# Patient Record
Sex: FEMALE | Race: WHITE | Employment: FULL TIME | URBAN - METROPOLITAN AREA
[De-identification: names, ages, dates, MRNs, and addresses within clinical notes are randomized per-mention and may not be internally consistent; named-entity substitution may affect disease eponyms.]

---

## 2017-11-10 PROCEDURE — 87510 GARDNER VAG DNA DIR PROBE: CPT | Performed by: NURSE PRACTITIONER

## 2017-11-10 PROCEDURE — 87660 TRICHOMONAS VAGIN DIR PROBE: CPT | Performed by: NURSE PRACTITIONER

## 2017-11-10 PROCEDURE — 87480 CANDIDA DNA DIR PROBE: CPT | Performed by: NURSE PRACTITIONER

## 2019-02-15 PROCEDURE — 87086 URINE CULTURE/COLONY COUNT: CPT | Performed by: NURSE PRACTITIONER

## 2019-02-22 PROCEDURE — 87086 URINE CULTURE/COLONY COUNT: CPT | Performed by: PHYSICIAN ASSISTANT

## 2019-03-01 ENCOUNTER — TELEPHONE (OUTPATIENT)
Dept: OBGYN CLINIC | Facility: CLINIC | Age: 34
End: 2019-03-01

## 2019-03-01 NOTE — TELEPHONE ENCOUNTER
Received thru fax from [facility] result of pt pap and HPV. Pt has an appt with you for new problem gyne on April 8th at 11:30 am in [place]. Please review result.   Thanks

## 2023-04-03 ENCOUNTER — OFFICE VISIT (OUTPATIENT)
Dept: FAMILY MEDICINE CLINIC | Facility: CLINIC | Age: 38
End: 2023-04-03
Payer: COMMERCIAL

## 2023-04-03 VITALS
HEIGHT: 62.5 IN | SYSTOLIC BLOOD PRESSURE: 104 MMHG | HEART RATE: 98 BPM | OXYGEN SATURATION: 98 % | DIASTOLIC BLOOD PRESSURE: 64 MMHG | RESPIRATION RATE: 16 BRPM | BODY MASS INDEX: 22.97 KG/M2 | WEIGHT: 128 LBS

## 2023-04-03 DIAGNOSIS — E04.9 GOITER: ICD-10-CM

## 2023-04-03 DIAGNOSIS — Z00.00 ANNUAL PHYSICAL EXAM: Primary | ICD-10-CM

## 2023-04-03 DIAGNOSIS — Z12.31 ENCOUNTER FOR SCREENING MAMMOGRAM FOR HIGH-RISK PATIENT: ICD-10-CM

## 2023-04-03 DIAGNOSIS — Z87.42 HISTORY OF ABNORMAL CERVICAL PAP SMEAR: ICD-10-CM

## 2023-04-03 DIAGNOSIS — E56.9 VITAMIN DEFICIENCY: ICD-10-CM

## 2023-04-03 DIAGNOSIS — Z80.3 FAMILY HISTORY OF BREAST CANCER IN FIRST DEGREE RELATIVE: ICD-10-CM

## 2023-04-03 PROCEDURE — 3078F DIAST BP <80 MM HG: CPT | Performed by: FAMILY MEDICINE

## 2023-04-03 PROCEDURE — 3074F SYST BP LT 130 MM HG: CPT | Performed by: FAMILY MEDICINE

## 2023-04-03 PROCEDURE — 99385 PREV VISIT NEW AGE 18-39: CPT | Performed by: FAMILY MEDICINE

## 2023-04-03 PROCEDURE — 3008F BODY MASS INDEX DOCD: CPT | Performed by: FAMILY MEDICINE

## 2023-04-03 RX ORDER — NAPROXEN 500 MG/1
TABLET ORAL
COMMUNITY
Start: 2023-01-24

## 2023-04-05 ENCOUNTER — TELEPHONE (OUTPATIENT)
Dept: FAMILY MEDICINE CLINIC | Facility: CLINIC | Age: 38
End: 2023-04-05

## 2023-04-05 NOTE — TELEPHONE ENCOUNTER
I called [address] [place] [address] for verification/inquiry purposes on patient getting the varicella vaccine with an allergy noted to neomycin in the chart. Spoke with [facility] pharmacist: Monika Loco   NOT a contraindication if neomycin causes a dermatis reaction. Do not use if anaphylaxis/anaphlactoid reaction noted.     Dr. Acosta Holland, please see message for pharmacist.

## 2023-04-17 ENCOUNTER — LAB ENCOUNTER (OUTPATIENT)
Dept: LAB | Age: 38
End: 2023-04-17
Attending: FAMILY MEDICINE
Payer: COMMERCIAL

## 2023-04-17 ENCOUNTER — HOSPITAL ENCOUNTER (OUTPATIENT)
Dept: ULTRASOUND IMAGING | Age: 38
Discharge: HOME | End: 2023-04-17
Attending: FAMILY MEDICINE
Payer: COMMERCIAL

## 2023-04-17 DIAGNOSIS — E04.9 GOITER: ICD-10-CM

## 2023-04-17 DIAGNOSIS — E56.9 VITAMIN DEFICIENCY: ICD-10-CM

## 2023-04-17 DIAGNOSIS — Z00.00 ANNUAL PHYSICAL EXAM: ICD-10-CM

## 2023-04-17 LAB
ALBUMIN SERPL-MCNC: 3.8 G/DL (ref 3.4–5)
ALBUMIN/GLOB SERPL: 1.1 {RATIO} (ref 1–2)
ALP LIVER SERPL-CCNC: 53 U/L
ALT SERPL-CCNC: 20 U/L
ANION GAP SERPL CALC-SCNC: 1 MMOL/L (ref 0–18)
AST SERPL-CCNC: 11 U/L (ref 15–37)
BASOPHILS # BLD AUTO: 0.05 X10(3) UL (ref 0–0.2)
BASOPHILS NFR BLD AUTO: 0.9 %
BILIRUB SERPL-MCNC: 0.6 MG/DL (ref 0.1–2)
BUN BLD-MCNC: 10 MG/DL (ref 7–18)
CALCIUM BLD-MCNC: 8.9 MG/DL (ref 8.5–10.1)
CHLORIDE SERPL-SCNC: 109 MMOL/L (ref 98–112)
CHOLEST SERPL-MCNC: 170 MG/DL (ref ?–200)
CO2 SERPL-SCNC: 24 MMOL/L (ref 21–32)
CREAT BLD-MCNC: 0.8 MG/DL
DEPRECATED HBV CORE AB SER IA-ACNC: 89.6 NG/ML
EOSINOPHIL # BLD AUTO: 0.1 X10(3) UL (ref 0–0.7)
EOSINOPHIL NFR BLD AUTO: 1.9 %
ERYTHROCYTE [DISTWIDTH] IN BLOOD BY AUTOMATED COUNT: 12.2 %
FASTING PATIENT LIPID ANSWER: YES
FASTING STATUS PATIENT QL REPORTED: YES
GFR SERPLBLD BASED ON 1.73 SQ M-ARVRAT: 97 ML/MIN/1.73M2 (ref 60–?)
GLOBULIN PLAS-MCNC: 3.4 G/DL (ref 2.8–4.4)
GLUCOSE BLD-MCNC: 89 MG/DL (ref 70–99)
HCT VFR BLD AUTO: 40.8 %
HDLC SERPL-MCNC: 57 MG/DL (ref 40–59)
HGB BLD-MCNC: 13.2 G/DL
IMM GRANULOCYTES # BLD AUTO: 0.02 X10(3) UL (ref 0–1)
IMM GRANULOCYTES NFR BLD: 0.4 %
LDLC SERPL CALC-MCNC: 95 MG/DL (ref ?–100)
LYMPHOCYTES # BLD AUTO: 1.28 X10(3) UL (ref 1–4)
LYMPHOCYTES NFR BLD AUTO: 24 %
MCH RBC QN AUTO: 29.7 PG (ref 26–34)
MCHC RBC AUTO-ENTMCNC: 32.4 G/DL (ref 31–37)
MCV RBC AUTO: 91.9 FL
MONOCYTES # BLD AUTO: 0.43 X10(3) UL (ref 0.1–1)
MONOCYTES NFR BLD AUTO: 8.1 %
NEUTROPHILS # BLD AUTO: 3.45 X10 (3) UL (ref 1.5–7.7)
NEUTROPHILS # BLD AUTO: 3.45 X10(3) UL (ref 1.5–7.7)
NEUTROPHILS NFR BLD AUTO: 64.7 %
NONHDLC SERPL-MCNC: 113 MG/DL (ref ?–130)
OSMOLALITY SERPL CALC.SUM OF ELEC: 277 MOSM/KG (ref 275–295)
PLATELET # BLD AUTO: 222 10(3)UL (ref 150–450)
POTASSIUM SERPL-SCNC: 4.1 MMOL/L (ref 3.5–5.1)
PROT SERPL-MCNC: 7.2 G/DL (ref 6.4–8.2)
RBC # BLD AUTO: 4.44 X10(6)UL
SODIUM SERPL-SCNC: 134 MMOL/L (ref 136–145)
T4 FREE SERPL-MCNC: 0.9 NG/DL (ref 0.8–1.7)
THYROGLOB SERPL-MCNC: <15 U/ML (ref ?–60)
THYROPEROXIDASE AB SERPL-ACNC: 108 U/ML (ref ?–60)
TRIGL SERPL-MCNC: 101 MG/DL (ref 30–149)
TSI SER-ACNC: 2.98 MIU/ML (ref 0.36–3.74)
VIT B12 SERPL-MCNC: 178 PG/ML (ref 193–986)
VIT D+METAB SERPL-MCNC: 17.6 NG/ML (ref 30–100)
VLDLC SERPL CALC-MCNC: 16 MG/DL (ref 0–30)
WBC # BLD AUTO: 5.3 X10(3) UL (ref 4–11)

## 2023-04-17 PROCEDURE — 82607 VITAMIN B-12: CPT | Performed by: FAMILY MEDICINE

## 2023-04-17 PROCEDURE — 86376 MICROSOMAL ANTIBODY EACH: CPT | Performed by: FAMILY MEDICINE

## 2023-04-17 PROCEDURE — 82306 VITAMIN D 25 HYDROXY: CPT | Performed by: FAMILY MEDICINE

## 2023-04-17 PROCEDURE — 86800 THYROGLOBULIN ANTIBODY: CPT | Performed by: FAMILY MEDICINE

## 2023-04-17 PROCEDURE — 84439 ASSAY OF FREE THYROXINE: CPT | Performed by: FAMILY MEDICINE

## 2023-04-17 PROCEDURE — 80061 LIPID PANEL: CPT | Performed by: FAMILY MEDICINE

## 2023-04-17 PROCEDURE — 80050 GENERAL HEALTH PANEL: CPT | Performed by: FAMILY MEDICINE

## 2023-04-17 PROCEDURE — 82728 ASSAY OF FERRITIN: CPT | Performed by: FAMILY MEDICINE

## 2023-04-17 PROCEDURE — 76536 US EXAM OF HEAD AND NECK: CPT | Performed by: FAMILY MEDICINE

## 2023-04-18 DIAGNOSIS — E53.8 VITAMIN B12 DEFICIENCY: Primary | ICD-10-CM

## 2023-04-18 DIAGNOSIS — R79.89 ABNORMAL THYROID BLOOD TEST: ICD-10-CM

## 2023-04-18 DIAGNOSIS — E55.9 VITAMIN D DEFICIENCY: ICD-10-CM

## 2023-04-25 ENCOUNTER — TELEMEDICINE (OUTPATIENT)
Dept: FAMILY MEDICINE CLINIC | Facility: CLINIC | Age: 38
End: 2023-04-25
Payer: COMMERCIAL

## 2023-04-25 DIAGNOSIS — E06.3 HASHIMOTO'S THYROIDITIS: Primary | ICD-10-CM

## 2023-04-25 DIAGNOSIS — E56.9 VITAMIN DEFICIENCY: ICD-10-CM

## 2023-04-25 DIAGNOSIS — Z83.79 FAMILY HISTORY OF CELIAC DISEASE: ICD-10-CM

## 2023-04-25 PROCEDURE — 99214 OFFICE O/P EST MOD 30 MIN: CPT | Performed by: FAMILY MEDICINE

## 2023-04-26 ENCOUNTER — TELEPHONE (OUTPATIENT)
Dept: FAMILY MEDICINE CLINIC | Facility: CLINIC | Age: 38
End: 2023-04-26

## 2023-04-26 NOTE — TELEPHONE ENCOUNTER
Pt sched for b12 shots for the next 4 wks, then monthly thru July.   Pt also schedule for a varicella vaccine

## 2023-04-27 ENCOUNTER — NURSE ONLY (OUTPATIENT)
Dept: FAMILY MEDICINE CLINIC | Facility: CLINIC | Age: 38
End: 2023-04-27
Payer: COMMERCIAL

## 2023-04-27 DIAGNOSIS — E56.9 VITAMIN DEFICIENCY: Primary | ICD-10-CM

## 2023-04-27 PROCEDURE — 96372 THER/PROPH/DIAG INJ SC/IM: CPT | Performed by: FAMILY MEDICINE

## 2023-04-27 PROCEDURE — 90471 IMMUNIZATION ADMIN: CPT | Performed by: FAMILY MEDICINE

## 2023-04-27 PROCEDURE — 90716 VAR VACCINE LIVE SUBQ: CPT | Performed by: FAMILY MEDICINE

## 2023-04-27 RX ORDER — CYANOCOBALAMIN 1000 UG/ML
1000 INJECTION, SOLUTION INTRAMUSCULAR; SUBCUTANEOUS ONCE
Status: COMPLETED | OUTPATIENT
Start: 2023-04-27 | End: 2023-04-27

## 2023-04-27 RX ADMIN — CYANOCOBALAMIN 1000 MCG: 1000 INJECTION, SOLUTION INTRAMUSCULAR; SUBCUTANEOUS at 11:22:00

## 2023-05-02 ENCOUNTER — HOSPITAL ENCOUNTER (OUTPATIENT)
Dept: MAMMOGRAPHY | Age: 38
Discharge: HOME | End: 2023-05-02
Attending: FAMILY MEDICINE
Payer: COMMERCIAL

## 2023-05-02 ENCOUNTER — LAB ENCOUNTER (OUTPATIENT)
Dept: LAB | Age: 38
End: 2023-05-02
Attending: FAMILY MEDICINE
Payer: COMMERCIAL

## 2023-05-02 DIAGNOSIS — Z12.31 ENCOUNTER FOR SCREENING MAMMOGRAM FOR HIGH-RISK PATIENT: ICD-10-CM

## 2023-05-02 DIAGNOSIS — Z80.3 FAMILY HISTORY OF BREAST CANCER IN FIRST DEGREE RELATIVE: ICD-10-CM

## 2023-05-02 DIAGNOSIS — E56.9 VITAMIN DEFICIENCY: ICD-10-CM

## 2023-05-02 DIAGNOSIS — Z83.79 FAMILY HISTORY OF CELIAC DISEASE: ICD-10-CM

## 2023-05-02 LAB
FOLATE SERPL-MCNC: 14 NG/ML (ref 8.7–?)
IGA SERPL-MCNC: 201 MG/DL (ref 70–312)

## 2023-05-02 PROCEDURE — 77067 SCR MAMMO BI INCL CAD: CPT | Performed by: FAMILY MEDICINE

## 2023-05-02 PROCEDURE — 77063 BREAST TOMOSYNTHESIS BI: CPT | Performed by: FAMILY MEDICINE

## 2023-05-02 PROCEDURE — 82746 ASSAY OF FOLIC ACID SERUM: CPT | Performed by: FAMILY MEDICINE

## 2023-05-02 PROCEDURE — 81377 HLA II TYPE 1 AG EQUIV LR: CPT | Performed by: FAMILY MEDICINE

## 2023-05-02 PROCEDURE — 86364 TISS TRNSGLTMNASE EA IG CLAS: CPT | Performed by: FAMILY MEDICINE

## 2023-05-03 DIAGNOSIS — R92.2 DENSE BREAST TISSUE ON MAMMOGRAM: Primary | ICD-10-CM

## 2023-05-04 ENCOUNTER — NURSE ONLY (OUTPATIENT)
Dept: FAMILY MEDICINE CLINIC | Facility: CLINIC | Age: 38
End: 2023-05-04
Payer: COMMERCIAL

## 2023-05-04 DIAGNOSIS — E56.9 VITAMIN DEFICIENCY: Primary | ICD-10-CM

## 2023-05-04 LAB — TTG IGA SER-ACNC: 0.5 U/ML (ref ?–7)

## 2023-05-04 PROCEDURE — 96372 THER/PROPH/DIAG INJ SC/IM: CPT | Performed by: FAMILY MEDICINE

## 2023-05-04 RX ORDER — CYANOCOBALAMIN 1000 UG/ML
1000 INJECTION, SOLUTION INTRAMUSCULAR; SUBCUTANEOUS ONCE
Status: COMPLETED | OUTPATIENT
Start: 2023-05-04 | End: 2023-05-04

## 2023-05-04 RX ADMIN — CYANOCOBALAMIN 1000 MCG: 1000 INJECTION, SOLUTION INTRAMUSCULAR; SUBCUTANEOUS at 10:52:00

## 2023-05-11 ENCOUNTER — NURSE ONLY (OUTPATIENT)
Dept: FAMILY MEDICINE CLINIC | Facility: CLINIC | Age: 38
End: 2023-05-11
Payer: COMMERCIAL

## 2023-05-11 DIAGNOSIS — E56.9 VITAMIN DEFICIENCY: Primary | ICD-10-CM

## 2023-05-11 PROCEDURE — 96372 THER/PROPH/DIAG INJ SC/IM: CPT | Performed by: FAMILY MEDICINE

## 2023-05-11 RX ORDER — CYANOCOBALAMIN 1000 UG/ML
1000 INJECTION, SOLUTION INTRAMUSCULAR; SUBCUTANEOUS ONCE
Status: COMPLETED | OUTPATIENT
Start: 2023-05-11 | End: 2023-05-11

## 2023-05-11 RX ADMIN — CYANOCOBALAMIN 1000 MCG: 1000 INJECTION, SOLUTION INTRAMUSCULAR; SUBCUTANEOUS at 11:21:00

## 2023-05-12 LAB
DQ2 (DQA1 0501/0505,DQB1 02XX): NEGATIVE
DQ8 (DQA1 03XX, DQB1 0302): NEGATIVE

## 2023-05-18 ENCOUNTER — NURSE ONLY (OUTPATIENT)
Dept: FAMILY MEDICINE CLINIC | Facility: CLINIC | Age: 38
End: 2023-05-18
Payer: COMMERCIAL

## 2023-05-18 DIAGNOSIS — E53.8 B12 DEFICIENCY: Primary | ICD-10-CM

## 2023-05-18 PROCEDURE — 96372 THER/PROPH/DIAG INJ SC/IM: CPT | Performed by: FAMILY MEDICINE

## 2023-05-18 RX ORDER — CYANOCOBALAMIN 1000 UG/ML
1000 INJECTION, SOLUTION INTRAMUSCULAR; SUBCUTANEOUS ONCE
Status: COMPLETED | OUTPATIENT
Start: 2023-05-18 | End: 2023-05-18

## 2023-05-18 RX ADMIN — CYANOCOBALAMIN 1000 MCG: 1000 INJECTION, SOLUTION INTRAMUSCULAR; SUBCUTANEOUS at 10:49:00

## 2023-05-25 ENCOUNTER — NURSE ONLY (OUTPATIENT)
Dept: FAMILY MEDICINE CLINIC | Facility: CLINIC | Age: 38
End: 2023-05-25
Payer: COMMERCIAL

## 2023-05-25 PROCEDURE — 90716 VAR VACCINE LIVE SUBQ: CPT | Performed by: FAMILY MEDICINE

## 2023-05-25 PROCEDURE — 90471 IMMUNIZATION ADMIN: CPT | Performed by: FAMILY MEDICINE

## 2023-06-16 ENCOUNTER — NURSE ONLY (OUTPATIENT)
Dept: FAMILY MEDICINE CLINIC | Facility: CLINIC | Age: 38
End: 2023-06-16
Payer: COMMERCIAL

## 2023-06-16 DIAGNOSIS — E53.8 B12 DEFICIENCY: Primary | ICD-10-CM

## 2023-06-16 PROCEDURE — 96372 THER/PROPH/DIAG INJ SC/IM: CPT | Performed by: FAMILY MEDICINE

## 2023-06-16 RX ORDER — CYANOCOBALAMIN 1000 UG/ML
1000 INJECTION, SOLUTION INTRAMUSCULAR; SUBCUTANEOUS ONCE
Status: COMPLETED | OUTPATIENT
Start: 2023-06-16 | End: 2023-06-16

## 2023-06-16 RX ADMIN — CYANOCOBALAMIN 1000 MCG: 1000 INJECTION, SOLUTION INTRAMUSCULAR; SUBCUTANEOUS at 09:36:00

## 2023-07-14 ENCOUNTER — NURSE ONLY (OUTPATIENT)
Dept: FAMILY MEDICINE CLINIC | Facility: CLINIC | Age: 38
End: 2023-07-14
Payer: COMMERCIAL

## 2023-07-14 DIAGNOSIS — E53.8 B12 DEFICIENCY: Primary | ICD-10-CM

## 2023-07-14 PROCEDURE — 96372 THER/PROPH/DIAG INJ SC/IM: CPT | Performed by: FAMILY MEDICINE

## 2023-07-14 RX ORDER — CYANOCOBALAMIN 1000 UG/ML
1000 INJECTION, SOLUTION INTRAMUSCULAR; SUBCUTANEOUS ONCE
Status: COMPLETED | OUTPATIENT
Start: 2023-07-14 | End: 2023-07-14

## 2023-07-14 RX ADMIN — CYANOCOBALAMIN 1000 MCG: 1000 INJECTION, SOLUTION INTRAMUSCULAR; SUBCUTANEOUS at 13:44:00

## 2023-07-24 ENCOUNTER — LAB ENCOUNTER (OUTPATIENT)
Dept: LAB | Age: 38
End: 2023-07-24
Attending: FAMILY MEDICINE
Payer: COMMERCIAL

## 2023-07-24 DIAGNOSIS — E53.8 VITAMIN B12 DEFICIENCY: ICD-10-CM

## 2023-07-24 DIAGNOSIS — E55.9 VITAMIN D DEFICIENCY: ICD-10-CM

## 2023-07-24 DIAGNOSIS — R79.89 ABNORMAL THYROID BLOOD TEST: ICD-10-CM

## 2023-07-24 DIAGNOSIS — E06.3 HASHIMOTO'S THYROIDITIS: ICD-10-CM

## 2023-07-24 DIAGNOSIS — E56.9 VITAMIN DEFICIENCY: ICD-10-CM

## 2023-07-24 LAB
T4 FREE SERPL-MCNC: 1 NG/DL (ref 0.8–1.7)
THYROGLOB SERPL-MCNC: 23 U/ML (ref ?–60)
THYROPEROXIDASE AB SERPL-ACNC: 103 U/ML (ref ?–60)
TSI SER-ACNC: 2.73 MIU/ML (ref 0.36–3.74)
VIT B12 SERPL-MCNC: 509 PG/ML (ref 193–986)
VIT D+METAB SERPL-MCNC: 34.6 NG/ML (ref 30–100)
VIT D+METAB SERPL-MCNC: 39.7 NG/ML (ref 30–100)

## 2023-07-24 PROCEDURE — 86376 MICROSOMAL ANTIBODY EACH: CPT | Performed by: FAMILY MEDICINE

## 2023-07-24 PROCEDURE — 82607 VITAMIN B-12: CPT | Performed by: FAMILY MEDICINE

## 2023-07-24 PROCEDURE — 86800 THYROGLOBULIN ANTIBODY: CPT | Performed by: FAMILY MEDICINE

## 2023-07-24 PROCEDURE — 82306 VITAMIN D 25 HYDROXY: CPT | Performed by: FAMILY MEDICINE

## 2023-07-24 PROCEDURE — 84443 ASSAY THYROID STIM HORMONE: CPT | Performed by: FAMILY MEDICINE

## 2023-07-24 PROCEDURE — 84439 ASSAY OF FREE THYROXINE: CPT | Performed by: FAMILY MEDICINE

## 2023-07-25 ENCOUNTER — TELEMEDICINE (OUTPATIENT)
Dept: FAMILY MEDICINE CLINIC | Facility: CLINIC | Age: 38
End: 2023-07-25
Payer: COMMERCIAL

## 2023-07-25 DIAGNOSIS — E55.9 VITAMIN D DEFICIENCY: ICD-10-CM

## 2023-07-25 DIAGNOSIS — E53.8 B12 DEFICIENCY: ICD-10-CM

## 2023-07-25 DIAGNOSIS — E06.3 HASHIMOTO'S THYROIDITIS: Primary | ICD-10-CM

## 2023-07-25 PROCEDURE — 99213 OFFICE O/P EST LOW 20 MIN: CPT | Performed by: FAMILY MEDICINE

## 2023-07-25 NOTE — PROGRESS NOTES
This visit is conducted using Telemedicine with live, interactive video and audio. Telehealth outside of [address] Verbal Consent   I conducted a telehealth visit with Baron Ray today, 07/25/23, which was completed using two-way, real-time interactive audio and video communication. This has been done in good trevor to provide continuity of care in the best interest of the provider-patient relationship, due to the COVID -19 public health crisis/national emergency where restrictions of face-to-face office visits are ongoing. Every conscious effort was taken to allow for sufficient and adequate time to complete the visit. The patient was made aware of the limitations of the telehealth visit, including treatment limitations as no physical exam could be performed. The patient was advised to call 911 or to go to the ER in case there was an emergency. The patient was also advised of the potential privacy & security concerns related to the telehealth platform. The patient was made aware of where to find [facility] notice of privacy practices, telehealth consent form and other related consent forms and documents. which are located on the [facility] website. The patient verbally agreed to telehealth consent form, related consents and the risks discussed. Lastly, the patient confirmed that they were in [place]. Included in this visit, time may have been spent reviewing labs, medications, radiology tests and decision making. Appropriate medical decision-making and tests are ordered as detailed in the plan of care above. Coding/billing information is submitted for this visit based on complexity of care and/or time spent for the visit.       HPI:   Baron Ray is a 45year old female who presents for lab follow up    Mom has hashimoto's   On B12 injections / D   Discussed AI diet     Component      Latest Ref Rng 4/17/2023   ANTI-THYROGLOBULIN      <60 U/mL <15    ANTI-THYROPEROXIDASE      <60 U/mL 108 (H)    T4,Free (Direct)      0.8 - 1.7 ng/dL 0.9    TSH      0.358 - 3.740 mIU/mL 2.980    Vitamin B12      193 - 986 pg/mL 178 (L)    FERRITIN      12.0 - 160.0 ng/mL 89.6    VITAMIN D, 25-OH, TOTAL      30.0 - 100.0 ng/mL 17.6 (L)       Legend:  (H) High  (L) Low          Current Outpatient Medications   Medication Sig Dispense Refill    naproxen 500 MG Oral Tab  (Patient not taking: Reported on 4/3/2023)      Clobetasol Propionate 0.05 % External Ointment Apply to AA on the left hand BID x1-2 weeks, then PRN for flares (Patient not taking: Reported on 4/3/2023) 30 g 0    hydrocortisone 2.5 % External Ointment Apply 1 Application topically 2 (two) times daily. Apply to lips BID for 1-2 weeks then PRN (Patient not taking: Reported on 4/3/2023) 28.35 g 2      Past Medical History:   Diagnosis Date    IBS (irritable bowel syndrome)       No past surgical history on file. Family History   Problem Relation Age of Onset    Cancer Mother         kidney    Breast Cancer Paternal Grandmother 45        EST      Social History:   Social History     Socioeconomic History    Marital status:    Occupational History    Occupation: Dance instructor   Tobacco Use    Smoking status: Never    Smokeless tobacco: Never   Vaping Use    Vaping Use: Never used   Substance and Sexual Activity    Alcohol use: Never     Alcohol/week: 0.0 standard drinks of alcohol    Drug use: Never    Sexual activity: Yes     Partners: Male     Occ: . : . Children: none   Dance teacher .    Exercise: yes  Diet: healthy     REVIEW OF SYSTEMS:   GENERAL: feels well otherwise  SKIN: denies any unusual skin lesions  EYES:denies blurred vision or double vision  HEENT: denies nasal congestion, sinus pain or ST  LUNGS: denies shortness of breath with exertion  CARDIOVASCULAR: denies chest pain on exertion  GI: denies abdominal pain,denies heartburn  : denies dysuria, vaginal discharge or itching   MUSCULOSKELETAL: denies back pain  NEURO: denies headaches  PSYCHE: denies depression or anxiety  HEMATOLOGIC: denies hx of anemia  ENDOCRINE: denies thyroid history  ALL/ASTHMA: denies hx of allergy or asthma    EXAM:     alert, appears stated age and cooperative, Normocephalic, without obvious abnormality, atraumatic, lips, mucosa, and tongue normal; teeth and gums normal, Speaking in full sentences comfortably, Normal work of breathing, Skin color, texture, turgor normal. No rashes or lesions and age appropriate, normal, logical connections, person, place and time/date and no suicidal ideation      ASSESSMENT AND PLAN:   Brittany Jimenez is a 45year old female who presents with     1. Hashimoto's thyroiditis    - FREE T4 (FREE THYROXINE); Future  - ASSAY, THYROID STIM HORMONE; Future  - THYROID PEROXIDASE AND THYROGLOBULIN ANTIBODIES; Future    2. B12 deficiency    - VITAMIN B12; Future    3. Vitamin D deficiency        Questions answered and patient indicates understanding of these issues and agrees to the plan.   Follow up in 3-6 MO or sooner if needed

## 2023-08-01 ENCOUNTER — HOSPITAL ENCOUNTER (OUTPATIENT)
Dept: ULTRASOUND IMAGING | Age: 38
Discharge: HOME | End: 2023-08-01
Attending: FAMILY MEDICINE
Payer: COMMERCIAL

## 2023-08-01 DIAGNOSIS — R92.2 DENSE BREAST TISSUE ON MAMMOGRAM: ICD-10-CM

## 2023-08-01 PROCEDURE — 76641 ULTRASOUND BREAST COMPLETE: CPT | Performed by: FAMILY MEDICINE

## 2023-08-17 ENCOUNTER — NURSE ONLY (OUTPATIENT)
Dept: FAMILY MEDICINE CLINIC | Facility: CLINIC | Age: 38
End: 2023-08-17
Payer: COMMERCIAL

## 2023-08-17 DIAGNOSIS — E53.8 B12 DEFICIENCY: Primary | ICD-10-CM

## 2023-08-17 PROCEDURE — 96372 THER/PROPH/DIAG INJ SC/IM: CPT | Performed by: FAMILY MEDICINE

## 2023-08-17 RX ORDER — CYANOCOBALAMIN 1000 UG/ML
1000 INJECTION, SOLUTION INTRAMUSCULAR; SUBCUTANEOUS ONCE
Status: COMPLETED | OUTPATIENT
Start: 2023-08-17 | End: 2023-08-17

## 2023-08-17 RX ADMIN — CYANOCOBALAMIN 1000 MCG: 1000 INJECTION, SOLUTION INTRAMUSCULAR; SUBCUTANEOUS at 15:56:00

## 2023-09-21 ENCOUNTER — NURSE ONLY (OUTPATIENT)
Dept: FAMILY MEDICINE CLINIC | Facility: CLINIC | Age: 38
End: 2023-09-21
Payer: COMMERCIAL

## 2023-09-21 DIAGNOSIS — E53.8 B12 DEFICIENCY: Primary | ICD-10-CM

## 2023-09-21 PROCEDURE — 96372 THER/PROPH/DIAG INJ SC/IM: CPT | Performed by: FAMILY MEDICINE

## 2023-09-21 RX ORDER — CYANOCOBALAMIN 1000 UG/ML
1000 INJECTION, SOLUTION INTRAMUSCULAR; SUBCUTANEOUS ONCE
Status: COMPLETED | OUTPATIENT
Start: 2023-09-21 | End: 2023-09-21

## 2023-09-21 RX ADMIN — CYANOCOBALAMIN 1000 MCG: 1000 INJECTION, SOLUTION INTRAMUSCULAR; SUBCUTANEOUS at 11:01:00

## 2023-09-22 ENCOUNTER — TELEPHONE (OUTPATIENT)
Dept: FAMILY MEDICINE CLINIC | Facility: CLINIC | Age: 38
End: 2023-09-22

## 2023-09-22 NOTE — TELEPHONE ENCOUNTER
Patient came in for B12 injection on 23. Patient states she is trying to conceive and taking pre genet and fish oil. She wants ot know should she switch to a B complex? Continue monthly B12 injections, if so for how many months? Patient to send pre genet with how much b12 is in her supplement.      Component  Ref Range & Units 23  8:19 AM   Vitamin B12  193 - 986 pg/mL 509

## 2023-09-25 NOTE — TELEPHONE ENCOUNTER
See attached vitamins pt is taking. Pt also doing B12 injections monthly, should she switch to OTC b12 as recommended?

## 2024-02-01 LAB
HEPATITIS B SURFACE ANTIGEN OB: NEGATIVE
RH FACTOR OB: POSITIVE

## 2024-06-13 ENCOUNTER — HOSPITAL ENCOUNTER (OUTPATIENT)
Facility: HOSPITAL | Age: 39
Discharge: HOME | End: 2024-06-13
Attending: OBSTETRICS & GYNECOLOGY | Admitting: OBSTETRICS & GYNECOLOGY
Payer: COMMERCIAL

## 2024-06-13 VITALS
DIASTOLIC BLOOD PRESSURE: 55 MMHG | SYSTOLIC BLOOD PRESSURE: 107 MMHG | BODY MASS INDEX: 29 KG/M2 | HEART RATE: 70 BPM | WEIGHT: 163 LBS | RESPIRATION RATE: 18 BRPM | TEMPERATURE: 98 F

## 2024-06-13 LAB
BILIRUBIN URINE: NEGATIVE
BLOOD URINE: NEGATIVE
CONTROL RUN WITHIN 24 HOURS?: YES
GLUCOSE URINE: NEGATIVE
KETONE URINE: NEGATIVE
LEUKOCYTE ESTERASE URINE: NEGATIVE
NITRITE URINE: NEGATIVE
PH URINE: 6.5 (ref 5–8)
PROTEIN URINE: NEGATIVE
SPEC GRAVITY: 1.01 (ref 1–1.03)
URINE CLARITY: CLEAR
URINE COLOR: YELLOW
UROBILINOGEN URINE: 0.2

## 2024-06-13 PROCEDURE — 99214 OFFICE O/P EST MOD 30 MIN: CPT

## 2024-06-13 PROCEDURE — 81002 URINALYSIS NONAUTO W/O SCOPE: CPT

## 2024-06-13 PROCEDURE — 59025 FETAL NON-STRESS TEST: CPT

## 2024-06-13 RX ORDER — CHOLECALCIFEROL (VITAMIN D3) 25 MCG
1 TABLET,CHEWABLE ORAL DAILY
COMMUNITY

## 2024-06-13 RX ORDER — LEVOTHYROXINE SODIUM 0.05 MG/1
50 TABLET ORAL
COMMUNITY

## 2024-06-13 NOTE — DISCHARGE INSTRUCTIONS
Discharge Instructions    Diet: regular diet, drink plenty of fluids  Activity: Normal activity         General Instructions    Call your OB doctor if: Fluid leaking from your vagina;Decrease in fetal movement;Vaginal or rectal pressure;Vaginal bleeding;Uterine contractions increasing in intensity and frequency;Uterine contractions 10 minutes or closer for 1 to 2 hours;Temperature greater than 100F                 Uterine Activity Instructions:

## 2024-06-13 NOTE — NST
Nonstress Test   Patient: Haley Zaldivar    Gestation: 27w6d    NST:       Variability: Moderate           Accelerations: Yes           Decelerations: None            Baseline: 140 BPM           Uterine Irritability: No           Contractions: Not present                                        Acoustic Stimulator: No           Nonstress Test Interpretation: Appropriate for gestational age           Nonstress Test Second Interpretation: Appropriate for gestational age                     Additional Comments

## 2024-06-13 NOTE — PROGRESS NOTES
Pt is a 39 year old female admitted to TRG4/TRG4-A.     Chief Complaint   Patient presents with     Complication    R/o Rom     Having uti symptoms, does have some leaking on pad , seems like urine but unsure. Minimal pain dull and irritation, feels almost like a yeast infection. Denies any bleeding, had bleeding earlier in pregnancy, active fetal movement.       Pt is  27w6d intra-uterine pregnancy.  History obtained, consents signed. Oriented to room, staff, and plan of care.

## 2024-07-24 LAB — HIV RESULT OB: NEGATIVE

## 2024-08-08 LAB — STREP GP B CULT OB: NEGATIVE

## 2024-08-20 ENCOUNTER — TELEPHONE (OUTPATIENT)
Dept: OBGYN UNIT | Facility: HOSPITAL | Age: 39
End: 2024-08-20

## 2024-08-29 ENCOUNTER — TELEPHONE (OUTPATIENT)
Dept: OBGYN UNIT | Facility: HOSPITAL | Age: 39
End: 2024-08-29

## 2024-08-29 ENCOUNTER — HOSPITAL ENCOUNTER (INPATIENT)
Facility: HOSPITAL | Age: 39
LOS: 4 days | Discharge: HOME | End: 2024-09-02
Attending: OBSTETRICS & GYNECOLOGY | Admitting: OBSTETRICS & GYNECOLOGY
Payer: COMMERCIAL

## 2024-08-29 DIAGNOSIS — Z98.891 H/O CESAREAN SECTION: Primary | ICD-10-CM

## 2024-08-29 PROBLEM — Z34.90 PREGNANCY (HCC): Status: ACTIVE | Noted: 2024-08-29

## 2024-08-29 LAB
ANTIBODY SCREEN: NEGATIVE
BASOPHILS # BLD AUTO: 0.03 X10(3) UL (ref 0–0.2)
BASOPHILS NFR BLD AUTO: 0.3 %
EOSINOPHIL # BLD AUTO: 0.09 X10(3) UL (ref 0–0.7)
EOSINOPHIL NFR BLD AUTO: 0.8 %
ERYTHROCYTE [DISTWIDTH] IN BLOOD BY AUTOMATED COUNT: 13.7 %
HCT VFR BLD AUTO: 36.8 %
HGB BLD-MCNC: 12.4 G/DL
IMM GRANULOCYTES # BLD AUTO: 0.1 X10(3) UL (ref 0–1)
IMM GRANULOCYTES NFR BLD: 0.9 %
LYMPHOCYTES # BLD AUTO: 1.17 X10(3) UL (ref 1–4)
LYMPHOCYTES NFR BLD AUTO: 10.6 %
MCH RBC QN AUTO: 30.8 PG (ref 26–34)
MCHC RBC AUTO-ENTMCNC: 33.7 G/DL (ref 31–37)
MCV RBC AUTO: 91.5 FL
MONOCYTES # BLD AUTO: 0.83 X10(3) UL (ref 0.1–1)
MONOCYTES NFR BLD AUTO: 7.5 %
NEUTROPHILS # BLD AUTO: 8.81 X10 (3) UL (ref 1.5–7.7)
NEUTROPHILS # BLD AUTO: 8.81 X10(3) UL (ref 1.5–7.7)
NEUTROPHILS NFR BLD AUTO: 79.9 %
PLATELET # BLD AUTO: 162 10(3)UL (ref 150–450)
RBC # BLD AUTO: 4.02 X10(6)UL
RH BLOOD TYPE: POSITIVE
T PALLIDUM AB SER QL IA: NONREACTIVE
WBC # BLD AUTO: 11 X10(3) UL (ref 4–11)

## 2024-08-29 PROCEDURE — 86900 BLOOD TYPING SEROLOGIC ABO: CPT | Performed by: OBSTETRICS & GYNECOLOGY

## 2024-08-29 PROCEDURE — 86780 TREPONEMA PALLIDUM: CPT | Performed by: OBSTETRICS & GYNECOLOGY

## 2024-08-29 PROCEDURE — 3E0DXGC INTRODUCTION OF OTHER THERAPEUTIC SUBSTANCE INTO MOUTH AND PHARYNX, EXTERNAL APPROACH: ICD-10-PCS | Performed by: OBSTETRICS & GYNECOLOGY

## 2024-08-29 PROCEDURE — 86901 BLOOD TYPING SEROLOGIC RH(D): CPT | Performed by: OBSTETRICS & GYNECOLOGY

## 2024-08-29 PROCEDURE — 86850 RBC ANTIBODY SCREEN: CPT | Performed by: OBSTETRICS & GYNECOLOGY

## 2024-08-29 PROCEDURE — 85025 COMPLETE CBC W/AUTO DIFF WBC: CPT | Performed by: OBSTETRICS & GYNECOLOGY

## 2024-08-29 RX ORDER — ACETAMINOPHEN 500 MG
500 TABLET ORAL EVERY 6 HOURS PRN
Status: DISCONTINUED | OUTPATIENT
Start: 2024-08-29 | End: 2024-08-30 | Stop reason: HOSPADM

## 2024-08-29 RX ORDER — TERBUTALINE SULFATE 1 MG/ML
0.25 INJECTION, SOLUTION SUBCUTANEOUS AS NEEDED
Status: DISCONTINUED | OUTPATIENT
Start: 2024-08-29 | End: 2024-08-30 | Stop reason: HOSPADM

## 2024-08-29 RX ORDER — HYDROMORPHONE HYDROCHLORIDE 1 MG/ML
1 INJECTION, SOLUTION INTRAMUSCULAR; INTRAVENOUS; SUBCUTANEOUS ONCE
Status: DISCONTINUED | OUTPATIENT
Start: 2024-08-29 | End: 2024-08-30

## 2024-08-29 RX ORDER — DEXTROSE, SODIUM CHLORIDE, SODIUM LACTATE, POTASSIUM CHLORIDE, AND CALCIUM CHLORIDE 5; .6; .31; .03; .02 G/100ML; G/100ML; G/100ML; G/100ML; G/100ML
INJECTION, SOLUTION INTRAVENOUS AS NEEDED
Status: DISCONTINUED | OUTPATIENT
Start: 2024-08-29 | End: 2024-08-30 | Stop reason: HOSPADM

## 2024-08-29 RX ORDER — ACETAMINOPHEN 500 MG
1000 TABLET ORAL EVERY 6 HOURS PRN
Status: DISCONTINUED | OUTPATIENT
Start: 2024-08-29 | End: 2024-08-30 | Stop reason: HOSPADM

## 2024-08-29 RX ORDER — ONDANSETRON 2 MG/ML
4 INJECTION INTRAMUSCULAR; INTRAVENOUS EVERY 6 HOURS PRN
Status: DISCONTINUED | OUTPATIENT
Start: 2024-08-29 | End: 2024-08-30 | Stop reason: HOSPADM

## 2024-08-29 RX ORDER — CALCIUM CARBONATE 500 MG/1
1000 TABLET, CHEWABLE ORAL EVERY 4 HOURS PRN
Status: DISCONTINUED | OUTPATIENT
Start: 2024-08-29 | End: 2024-08-30 | Stop reason: HOSPADM

## 2024-08-29 RX ORDER — CITRIC ACID/SODIUM CITRATE 334-500MG
30 SOLUTION, ORAL ORAL AS NEEDED
Status: DISCONTINUED | OUTPATIENT
Start: 2024-08-29 | End: 2024-08-30 | Stop reason: HOSPADM

## 2024-08-29 RX ORDER — IBUPROFEN 600 MG/1
600 TABLET, FILM COATED ORAL ONCE AS NEEDED
Status: DISCONTINUED | OUTPATIENT
Start: 2024-08-29 | End: 2024-08-30 | Stop reason: HOSPADM

## 2024-08-29 RX ORDER — SODIUM CHLORIDE, SODIUM LACTATE, POTASSIUM CHLORIDE, CALCIUM CHLORIDE 600; 310; 30; 20 MG/100ML; MG/100ML; MG/100ML; MG/100ML
INJECTION, SOLUTION INTRAVENOUS CONTINUOUS
Status: DISCONTINUED | OUTPATIENT
Start: 2024-08-29 | End: 2024-08-30 | Stop reason: HOSPADM

## 2024-08-30 ENCOUNTER — ANESTHESIA (OUTPATIENT)
Dept: OBGYN UNIT | Facility: HOSPITAL | Age: 39
End: 2024-08-30
Payer: COMMERCIAL

## 2024-08-30 ENCOUNTER — ANESTHESIA EVENT (OUTPATIENT)
Dept: OBGYN UNIT | Facility: HOSPITAL | Age: 39
End: 2024-08-30
Payer: COMMERCIAL

## 2024-08-30 LAB — RH BLOOD TYPE: POSITIVE

## 2024-08-30 PROCEDURE — 3E033VJ INTRODUCTION OF OTHER HORMONE INTO PERIPHERAL VEIN, PERCUTANEOUS APPROACH: ICD-10-PCS | Performed by: OBSTETRICS & GYNECOLOGY

## 2024-08-30 PROCEDURE — 10H07YZ INSERTION OF OTHER DEVICE INTO PRODUCTS OF CONCEPTION, VIA NATURAL OR ARTIFICIAL OPENING: ICD-10-PCS | Performed by: OBSTETRICS & GYNECOLOGY

## 2024-08-30 PROCEDURE — 88307 TISSUE EXAM BY PATHOLOGIST: CPT | Performed by: OBSTETRICS & GYNECOLOGY

## 2024-08-30 RX ORDER — IBUPROFEN 600 MG/1
600 TABLET, FILM COATED ORAL EVERY 6 HOURS
Status: DISCONTINUED | OUTPATIENT
Start: 2024-08-31 | End: 2024-09-02

## 2024-08-30 RX ORDER — DIPHENHYDRAMINE HYDROCHLORIDE 50 MG/ML
25 INJECTION INTRAMUSCULAR; INTRAVENOUS ONCE AS NEEDED
Status: DISCONTINUED | OUTPATIENT
Start: 2024-08-30 | End: 2024-08-30 | Stop reason: HOSPADM

## 2024-08-30 RX ORDER — SODIUM CHLORIDE, SODIUM LACTATE, POTASSIUM CHLORIDE, CALCIUM CHLORIDE 600; 310; 30; 20 MG/100ML; MG/100ML; MG/100ML; MG/100ML
INJECTION, SOLUTION INTRAVENOUS CONTINUOUS PRN
Status: DISCONTINUED | OUTPATIENT
Start: 2024-08-30 | End: 2024-08-30 | Stop reason: SURG

## 2024-08-30 RX ORDER — LEVOTHYROXINE SODIUM 50 UG/1
50 TABLET ORAL
Status: DISCONTINUED | OUTPATIENT
Start: 2024-08-30 | End: 2024-09-02

## 2024-08-30 RX ORDER — SIMETHICONE 80 MG
80 TABLET,CHEWABLE ORAL DAILY PRN
Status: DISCONTINUED | OUTPATIENT
Start: 2024-08-30 | End: 2024-09-02

## 2024-08-30 RX ORDER — DOCUSATE SODIUM 100 MG/1
100 CAPSULE, LIQUID FILLED ORAL
Status: DISCONTINUED | OUTPATIENT
Start: 2024-08-30 | End: 2024-09-02

## 2024-08-30 RX ORDER — KETOROLAC TROMETHAMINE 30 MG/ML
INJECTION, SOLUTION INTRAMUSCULAR; INTRAVENOUS
Status: COMPLETED
Start: 2024-08-30 | End: 2024-08-30

## 2024-08-30 RX ORDER — ONDANSETRON 2 MG/ML
4 INJECTION INTRAMUSCULAR; INTRAVENOUS EVERY 6 HOURS PRN
Status: DISCONTINUED | OUTPATIENT
Start: 2024-08-30 | End: 2024-09-02

## 2024-08-30 RX ORDER — BISACODYL 10 MG
10 SUPPOSITORY, RECTAL RECTAL ONCE AS NEEDED
Status: DISCONTINUED | OUTPATIENT
Start: 2024-08-30 | End: 2024-09-02

## 2024-08-30 RX ORDER — SODIUM CHLORIDE 9 MG/ML
INJECTION, SOLUTION INTRAVENOUS ONCE
Status: DISCONTINUED | OUTPATIENT
Start: 2024-08-30 | End: 2024-08-30

## 2024-08-30 RX ORDER — NALBUPHINE HYDROCHLORIDE 10 MG/ML
2.5 INJECTION, SOLUTION INTRAMUSCULAR; INTRAVENOUS; SUBCUTANEOUS
Status: DISCONTINUED | OUTPATIENT
Start: 2024-08-30 | End: 2024-08-30 | Stop reason: HOSPADM

## 2024-08-30 RX ORDER — HYDROMORPHONE HYDROCHLORIDE 1 MG/ML
0.3 INJECTION, SOLUTION INTRAMUSCULAR; INTRAVENOUS; SUBCUTANEOUS EVERY 2 HOUR PRN
Status: DISCONTINUED | OUTPATIENT
Start: 2024-08-30 | End: 2024-09-02

## 2024-08-30 RX ORDER — NALBUPHINE HYDROCHLORIDE 10 MG/ML
2.5 INJECTION, SOLUTION INTRAMUSCULAR; INTRAVENOUS; SUBCUTANEOUS
Status: DISCONTINUED | OUTPATIENT
Start: 2024-08-30 | End: 2024-08-30

## 2024-08-30 RX ORDER — OXYCODONE HYDROCHLORIDE 5 MG/1
5 TABLET ORAL EVERY 6 HOURS PRN
Status: DISCONTINUED | OUTPATIENT
Start: 2024-08-30 | End: 2024-09-02

## 2024-08-30 RX ORDER — MORPHINE SULFATE 2 MG/ML
INJECTION, SOLUTION INTRAMUSCULAR; INTRAVENOUS AS NEEDED
Status: DISCONTINUED | OUTPATIENT
Start: 2024-08-30 | End: 2024-08-30 | Stop reason: SURG

## 2024-08-30 RX ORDER — HYDROMORPHONE HYDROCHLORIDE 1 MG/ML
0.4 INJECTION, SOLUTION INTRAMUSCULAR; INTRAVENOUS; SUBCUTANEOUS EVERY 5 MIN PRN
Status: DISCONTINUED | OUTPATIENT
Start: 2024-08-30 | End: 2024-08-30 | Stop reason: HOSPADM

## 2024-08-30 RX ORDER — HYDROMORPHONE HYDROCHLORIDE 1 MG/ML
0.2 INJECTION, SOLUTION INTRAMUSCULAR; INTRAVENOUS; SUBCUTANEOUS EVERY 5 MIN PRN
Status: DISCONTINUED | OUTPATIENT
Start: 2024-08-30 | End: 2024-08-30 | Stop reason: HOSPADM

## 2024-08-30 RX ORDER — ONDANSETRON 2 MG/ML
4 INJECTION INTRAMUSCULAR; INTRAVENOUS ONCE AS NEEDED
Status: DISCONTINUED | OUTPATIENT
Start: 2024-08-30 | End: 2024-08-30 | Stop reason: HOSPADM

## 2024-08-30 RX ORDER — KETOROLAC TROMETHAMINE 30 MG/ML
30 INJECTION, SOLUTION INTRAMUSCULAR; INTRAVENOUS EVERY 6 HOURS
Status: DISPENSED | OUTPATIENT
Start: 2024-08-30 | End: 2024-08-31

## 2024-08-30 RX ORDER — BUPIVACAINE HCL/0.9 % NACL/PF 0.25 %
5 PLASTIC BAG, INJECTION (ML) EPIDURAL AS NEEDED
Status: DISCONTINUED | OUTPATIENT
Start: 2024-08-30 | End: 2024-08-30

## 2024-08-30 RX ORDER — DEXTROSE, SODIUM CHLORIDE, SODIUM LACTATE, POTASSIUM CHLORIDE, AND CALCIUM CHLORIDE 5; .6; .31; .03; .02 G/100ML; G/100ML; G/100ML; G/100ML; G/100ML
INJECTION, SOLUTION INTRAVENOUS CONTINUOUS PRN
Status: DISCONTINUED | OUTPATIENT
Start: 2024-08-30 | End: 2024-09-02

## 2024-08-30 RX ORDER — ONDANSETRON 2 MG/ML
INJECTION INTRAMUSCULAR; INTRAVENOUS AS NEEDED
Status: DISCONTINUED | OUTPATIENT
Start: 2024-08-30 | End: 2024-08-30 | Stop reason: SURG

## 2024-08-30 RX ORDER — LIDOCAINE HCL/EPINEPHRINE/PF 2%-1:200K
VIAL (ML) INJECTION AS NEEDED
Status: DISCONTINUED | OUTPATIENT
Start: 2024-08-30 | End: 2024-08-30 | Stop reason: SURG

## 2024-08-30 RX ORDER — ACETAMINOPHEN 500 MG
1000 TABLET ORAL EVERY 6 HOURS
Status: DISCONTINUED | OUTPATIENT
Start: 2024-08-30 | End: 2024-09-02

## 2024-08-30 RX ORDER — LIDOCAINE HYDROCHLORIDE AND EPINEPHRINE 15; 5 MG/ML; UG/ML
INJECTION, SOLUTION EPIDURAL AS NEEDED
Status: DISCONTINUED | OUTPATIENT
Start: 2024-08-30 | End: 2024-08-30 | Stop reason: SURG

## 2024-08-30 RX ORDER — KETOROLAC TROMETHAMINE 30 MG/ML
30 INJECTION, SOLUTION INTRAMUSCULAR; INTRAVENOUS ONCE
Status: COMPLETED | OUTPATIENT
Start: 2024-08-30 | End: 2024-08-30

## 2024-08-30 RX ORDER — MIDAZOLAM HYDROCHLORIDE 1 MG/ML
INJECTION INTRAMUSCULAR; INTRAVENOUS AS NEEDED
Status: DISCONTINUED | OUTPATIENT
Start: 2024-08-30 | End: 2024-08-30 | Stop reason: SURG

## 2024-08-30 RX ORDER — SODIUM CHLORIDE, SODIUM LACTATE, POTASSIUM CHLORIDE, CALCIUM CHLORIDE 600; 310; 30; 20 MG/100ML; MG/100ML; MG/100ML; MG/100ML
INJECTION, SOLUTION INTRAVENOUS CONTINUOUS
Status: DISCONTINUED | OUTPATIENT
Start: 2024-08-30 | End: 2024-09-02

## 2024-08-30 RX ADMIN — LIDOCAINE HYDROCHLORIDE AND EPINEPHRINE 2 ML: 15; 5 INJECTION, SOLUTION EPIDURAL at 08:44:00

## 2024-08-30 RX ADMIN — Medication 10 ML: at 15:39:00

## 2024-08-30 RX ADMIN — MIDAZOLAM HYDROCHLORIDE 2 MG: 1 INJECTION INTRAMUSCULAR; INTRAVENOUS at 15:59:00

## 2024-08-30 RX ADMIN — LIDOCAINE HCL/EPINEPHRINE/PF 5 ML: 2%-1:200K VIAL (ML) INJECTION at 15:40:00

## 2024-08-30 RX ADMIN — LIDOCAINE HYDROCHLORIDE AND EPINEPHRINE 3 ML: 15; 5 INJECTION, SOLUTION EPIDURAL at 08:42:00

## 2024-08-30 RX ADMIN — MIDAZOLAM HYDROCHLORIDE 2 MG: 1 INJECTION INTRAMUSCULAR; INTRAVENOUS at 15:55:00

## 2024-08-30 RX ADMIN — LIDOCAINE HCL/EPINEPHRINE/PF 10 ML: 2%-1:200K VIAL (ML) INJECTION at 15:31:00

## 2024-08-30 RX ADMIN — SODIUM CHLORIDE, SODIUM LACTATE, POTASSIUM CHLORIDE, CALCIUM CHLORIDE: 600; 310; 30; 20 INJECTION, SOLUTION INTRAVENOUS at 15:31:00

## 2024-08-30 RX ADMIN — ONDANSETRON 4 MG: 2 INJECTION INTRAMUSCULAR; INTRAVENOUS at 15:31:00

## 2024-08-30 RX ADMIN — MORPHINE SULFATE 2 MG: 2 INJECTION, SOLUTION INTRAMUSCULAR; INTRAVENOUS at 15:54:00

## 2024-08-30 NOTE — ANESTHESIA POSTPROCEDURE EVALUATION
[facility]    Haley Zaldivar Patient Status:  Inpatient   Age/Gender 39 year old female MRN [id number]   Location [facility] LABOR & DELIVERY Attending Марина Hampton DO   Hosp Day # 1 PCP Diana Lomeli DO       Anesthesia Post-op Note     SECTION    Procedure Summary       Date: 24 Room / Location:  L+D OR  /  L+D OR    Anesthesia Start: 831 Anesthesia Stop:     Procedure:  SECTION (Abdomen) Diagnosis: (same, delivered)    Surgeons: Altaf Lundy MD Anesthesiologist: Talita Bauman MD    Anesthesia Type: epidural ASA Status: 2            Anesthesia Type: epidural    Vitals Value Taken Time   /86 24 1650   Temp 98 24 1658   Pulse 73 24 1658   Resp 13 24 1658   SpO2 96 % 24 1658   Vitals shown include unfiled device data.    Patient Location: PACU    Anesthesia Type: epidural    Airway Patency: patent    Postop Pain Control: adequate    Mental Status: preanesthetic baseline    Nausea/Vomiting: none    Cardiopulmonary/Hydration status: stable euvolemic    Complications: no apparent anesthesia related complications    Postop vital signs: stable    Dental Exam: Unchanged from Preop    Patient to be discharged from PACU when criteria met.

## 2024-08-30 NOTE — PROGRESS NOTES
PT is G female admitted to . Patient represents with induction for oligo with an DAVE of 3.2    PT is 39.0 week gestation intrauterine pregnancy.   History obtained, consents signed. Oriented to room, staff and plan of care.

## 2024-08-30 NOTE — PLAN OF CARE
Problem: BIRTH - VAGINAL/ SECTION  Goal: Fetal and maternal status remain reassuring during the birth process  Description: INTERVENTIONS:  - Monitor vital signs  - Monitor fetal heart rate  - Monitor uterine activity  - Monitor labor progression (vaginal delivery)  - DVT prophylaxis (C/S delivery)  - Surgical antibiotic prophylaxis (C/S delivery)  Outcome: Progressing     Problem: PAIN - ADULT  Goal: Verbalizes/displays adequate comfort level or patient's stated pain goal  Description: INTERVENTIONS:  - Encourage pt to monitor pain and request assistance  - Assess pain using appropriate pain scale  - Administer analgesics based on type and severity of pain and evaluate response  - Implement non-pharmacological measures as appropriate and evaluate response  - Consider cultural and social influences on pain and pain management  - Manage/alleviate anxiety  - Utilize distraction and/or relaxation techniques  - Monitor for opioid side effects  - Notify MD/LIP if interventions unsuccessful or patient reports new pain  - Anticipate increased pain with activity and pre-medicate as appropriate  Outcome: Progressing     Problem: ANXIETY  Goal: Will report anxiety at manageable levels  Description: INTERVENTIONS:  - Administer medication as ordered  - Teach and rehearse alternative coping skills  - Provide emotional support with 1:1 interaction with staff  Outcome: Progressing     Problem: Patient/Family Goals  Goal: Patient/Family Long Term Goal  Description: Patient's Long Term Goal: Uncomplicated vaginal delivery     Interventions:   VS per protocol   I&O   Ice chips and sips as tolerated   EFM per protocol   Maintain IV as ordered   Antibiotics as needed per protocol   Informed consent   Interventions:  -   - See additional Care Plan goals for specific interventions  2024 by Fred Tan, RN  Outcome: Progressing  2024 by Fred Tan, RN  Note: Uncomplicated vaginal delivery      Interventions:   VS per protocol   I&O   Ice chips and sips as tolerated   EFM per protocol   Maintain IV as ordered   Antibiotics as needed per protocol   Informed consent   Goal: Patient/Family Short Term Goal  Description: Patient's Short Term Goal: pain management     Interventions:   - breathing techniques   -epidural when warranted   - See additional Care Plan goals for specific interventions  Outcome: Progressing

## 2024-08-30 NOTE — PROGRESS NOTES
Patient transferred to mother/baby room 2216 per cart in stable condition with baby and personal belongings.  Accompanied by  and staff.  Report given to mother/baby RN.

## 2024-08-30 NOTE — PROGRESS NOTES
Called to room for recurrent variable decels with late component  FHT - 130, mod LTV, decels with contractions to 90. Category 2 tracing  IUPC and FSE placed.  Pitocin discontinued  Will do amnioinfusion.    CPM

## 2024-08-30 NOTE — H&P
[facility]  History & Physical    Haley Zaldivar Patient Status:  Inpatient    1985 MRN [id number]   Location [facility] LABOR & DELIVERY Attending Марина Hampton DO   Hosp Day # 1 PCP Diana Lomeli DO     SUBJECTIVE:    Haley Zaldivar is a 39 year old  female with EDC 24 at 39 and 0/7 weeks gestation who is being admitted for induction of labor.   She was in the office yesterday and was diagnosed with oligo (fluid level of 3).  She was admitted last night and received one dose of cytotec and had SROM.  She made cervical change and was 3cm this am and received an epidural.  Currently reports good pain control.  Pregnancy complicated by AMA and first trimester bleeding at 12weeks.     Obstetric History:   OB History    Para Term  AB Living   1 0           SAB IAB Ectopic Multiple Live Births                  # Outcome Date GA Lbr Hussein/2nd Weight Sex Type Anes PTL Lv   1 Current              Past Medical History:   Past Medical History:    Hypothyroidism    IBS (irritable bowel syndrome)     Past Social History: No past surgical history on file.  Family History:   Family History   Problem Relation Age of Onset    Cancer Mother         kidney    Breast Cancer Paternal Grandmother 38        EST     Social History:   Social History     Tobacco Use    Smoking status: Never    Smokeless tobacco: Never   Substance Use Topics    Alcohol use: Never     Alcohol/week: 0.0 standard drinks of alcohol       Home Meds:   Medications Prior to Admission   Medication Sig Dispense Refill Last Dose    prenatal vitamin with DHA 27-0.8-228 MG Oral Cap Take 1 capsule by mouth daily.   2024    levothyroxine 50 MCG Oral Tab Take 1 tablet (50 mcg total) by mouth before breakfast.   2024    cholecalciferol 1000 UNITS Oral Cap Take 1 capsule (1,000 Units total) by mouth daily.   2024    naproxen 500 MG Oral Tab  (Patient not taking: Reported on 4/3/2023)       Clobetasol Propionate  0.05 % External Ointment Apply to AA on the left hand BID x1-2 weeks, then PRN for flares (Patient not taking: Reported on 4/3/2023) 30 g 0     hydrocortisone 2.5 % External Ointment Apply 1 Application topically 2 (two) times daily. Apply to lips BID for 1-2 weeks then PRN (Patient not taking: Reported on 4/3/2023) 28.35 g 2      Allergies:   Allergies   Allergen Reactions    Amoxicillin HIVES    Bacitracin RASH     Patch test positive 1/2020      Gold Sodium Thiomalate RASH     Patch test positive 1/2020      Mercaptobenzothiazole RASH     Patch test positive 1/2020      Neomycin RASH     Patch test positive 1/2020      Nickel RASH     Patch test positive 1/2020    Thimerosal RASH     Patch test positive 1/2020         OBJECTIVE:    Temp:  [98.1 °F (36.7 °C)-98.4 °F (36.9 °C)] 98.1 °F (36.7 °C)  Pulse:  [] 104  Resp:  [16-18] 18  BP: ()/(48-77) 95/64  SpO2:  [100 %] 100 %    Lungs:   Breathing unlabored   Heart:   regular rate and rhythm   Abdomen: soft, nontender, nondistended, no abnormal masses, no epigastric pain   Fetal Surveillance:  140 BPM   Fetal heart variability: moderate  Uterine contractions: regular, every 2-3 minutes      Cervix: dilation 3, effacement 50, and station -2 with caput     Lab Review:  B, Rh+, Rubella-immune, Hepatitis B surface antigen non-reactive, GBS negative  Lab Results   Component Value Date    WBC 11.0 08/29/2024    HGB 12.4 08/29/2024    HCT 36.8 08/29/2024    .0 08/29/2024          ASSESSMENT/PLAN:    39 and 0/7 weeks gestation.  2. Oligohydramnios - IOL.  Currently on pitocin  3. AMA  4. FWB - category 2 at times but currently category 1.  Will monitor closely.  Discussed possible amnioinfusion.      Risks, benefits, alternatives and possible complications have been discussed in detail with the patient.  All questions answered and all appropriate consents have been signed        Altaf Lundy MD  8/30/2024  9:32 AM

## 2024-08-30 NOTE — PROGRESS NOTES
Pt educated on the risks and benefits of oxytocin at this time. All patients questions and concerns answered at this time. Pt agreeable to plan of care to continue with administer of oxytocin.

## 2024-08-30 NOTE — ANESTHESIA PREPROCEDURE EVALUATION
PRE-OP EVALUATION    Patient Name: Haley Zaldivar    Admit Diagnosis: Pregnancy (HCC) [Z34.90]    Pre-op Diagnosis: * No surgery found *        Anesthesia Procedure: LABOR ANALGESIA    * Surgery not found *    Pre-op vitals reviewed.  Temp: 98.1 °F (36.7 °C)  Pulse: 69  Resp: 18  BP: 123/62     Body mass index is 32.4 kg/m².    Current medications reviewed.  Hospital Medications:   lactated ringers IV bolus 1,000 mL  1,000 mL Intravenous Once    fentaNYL-bupivacaine 2 mcg/mL-0.125% in sodium chloride 0.9% 100 mL EPIDURAL infusion premix  12 mL/hr Epidural Continuous    fentaNYL (Sublimaze) 50 mcg/mL injection 100 mcg  100 mcg Epidural Once    EPHEDrine (PF) 25 MG/5 ML injection 5 mg  5 mg Intravenous PRN    nalbuphine (Nubain) 10 mg/mL injection 2.5 mg  2.5 mg Intravenous Q15 Min PRN    lactated ringers infusion   Intravenous Continuous    dextrose in lactated ringers 5% infusion   Intravenous PRN    lactated ringers IV bolus 500 mL  500 mL Intravenous PRN    acetaminophen (Tylenol Extra Strength) tab 500 mg  500 mg Oral Q6H PRN    acetaminophen (Tylenol Extra Strength) tab 1,000 mg  1,000 mg Oral Q6H PRN    ibuprofen (Motrin) tab 600 mg  600 mg Oral Once PRN    ondansetron (Zofran) 4 MG/2ML injection 4 mg  4 mg Intravenous Q6H PRN    oxyTOCIN in sodium chloride 0.9% (Pitocin) 30 Units/500mL infusion premix  62.5-900 christopher-units/min Intravenous Continuous    terbutaline (Brethine) 1 MG/ML injection 0.25 mg  0.25 mg Subcutaneous PRN    sodium citrate-citric acid (Bicitra) 500-334 MG/5ML oral solution 30 mL  30 mL Oral PRN    calcium carbonate (Tums) chewable tab 1,000 mg  1,000 mg Oral Q4H PRN    oxyTOCIN in sodium chloride 0.9% (Pitocin) 30 Units/500mL infusion premix  0.5-20 christopher-units/min Intravenous Continuous    HYDROmorphone (Dilaudid) 1 MG/ML injection 1 mg  1 mg Intravenous Once       Outpatient Medications:     Medications Prior to Admission   Medication Sig Dispense Refill Last Dose    prenatal vitamin  with DHA 27-0.8-228 MG Oral Cap Take 1 capsule by mouth daily.   8/28/2024    levothyroxine 50 MCG Oral Tab Take 1 tablet (50 mcg total) by mouth before breakfast.   8/29/2024    cholecalciferol 1000 UNITS Oral Cap Take 1 capsule (1,000 Units total) by mouth daily.   8/29/2024    naproxen 500 MG Oral Tab  (Patient not taking: Reported on 4/3/2023)       Clobetasol Propionate 0.05 % External Ointment Apply to AA on the left hand BID x1-2 weeks, then PRN for flares (Patient not taking: Reported on 4/3/2023) 30 g 0     hydrocortisone 2.5 % External Ointment Apply 1 Application topically 2 (two) times daily. Apply to lips BID for 1-2 weeks then PRN (Patient not taking: Reported on 4/3/2023) 28.35 g 2        Allergies: Amoxicillin, Bacitracin, Gold sodium thiomalate, Mercaptobenzothiazole, Neomycin, Nickel, and Thimerosal      Anesthesia Evaluation    Patient summary reviewed.    Anesthetic Complications           GI/Hepatic/Renal    Negative GI/hepatic/renal ROS.                             Cardiovascular    Negative cardiovascular ROS.                                                   Endo/Other    Negative endo/other ROS.                              Pulmonary    Negative pulmonary ROS.                       Neuro/Psych    Negative neuro/psych ROS.                                  No past surgical history on file.  Social History     Socioeconomic History    Marital status:    Occupational History    Occupation: Dance instructor   Tobacco Use    Smoking status: Never    Smokeless tobacco: Never   Vaping Use    Vaping status: Never Used   Substance and Sexual Activity    Alcohol use: Never     Alcohol/week: 0.0 standard drinks of alcohol    Drug use: Never    Sexual activity: Yes     Partners: Male     History   Drug Use Unknown     Available pre-op labs reviewed.  Lab Results   Component Value Date    WBC 11.0 08/29/2024    RBC 4.02 08/29/2024    HGB 12.4 08/29/2024    HCT 36.8 08/29/2024    MCV 91.5 08/29/2024     MCH 30.8 08/29/2024    MCHC 33.7 08/29/2024    RDW 13.7 08/29/2024    .0 08/29/2024               Airway      Mallampati: II  Mouth opening: >3 FB  TM distance: > 6 cm  Neck ROM: full Cardiovascular    Cardiovascular exam normal.         Dental    Dentition appears grossly intact         Pulmonary    Pulmonary exam normal.                 Other findings              ASA: 2   Plan: epidural  NPO status verified and patient meets guidelines.    Post-procedure pain management plan discussed with surgeon and patient.    Comment: Discussed risks of bleeding, infection, failed block, postdural puncture headache, nerve damage.   Plan/risks discussed with: patient                Present on Admission:  **None**

## 2024-08-30 NOTE — OPERATIVE REPORT
[facility]   Section - Operative Note    Haley Zaldivar Patient Status:  Inpatient    1985 MRN [id number]   Location [facility] LABOR & DELIVERY Attending Марина Hampton DO   Hosp Day # 1 PCP Diana Lomeli DO       Preoperative Diagnosis: IUP at 39weeks;Oligo;          Postoperative Diagnosis: Same    Procedure: Primary  Low Transverse  Section    Primary surgeon: Nikkie    Assistant: Kelsey    Indications:  elective    Surgical Findings: Baby: Viable male    Weight 7lbs 5oz  APGAR 1': 8  APGAR 5': 9      Anesthesia :Spinal    EBL: see QBL    Procedure:     The patient was prepped and draped in the usual sterile fashion. A time out was performed and scd’s were placed to decrease her risk for DVT and a dose of antibiotics was given preoperatively to decrease her risk for infection. Anesthesia was found to be adequate. A Pfannenstiel skin incision was made and extended down to the level of the fascia. The fascia was incised and extended laterally with Leiva scissors.  The fascia was then dissected off the rectus muscles superiorly and inferiorly.  The peritoneal cavity was entered atraumatically and extended superiorly and inferiorly with good visualization of the bladder.  Bladder blade was inserted.  The vesico-uterine peritoneum was entered sharply using metzenbaum scissors.  A bladder flap was created.  A low transverse incision was created and extended laterally using blunt finger dissection and amniotomy was performed. The amniotic fluid was clear. The infant was noted to be in the vertex position.  The head was delivered and the infant was bulb suctioned.  The remainder of the body was delivered without complication.  The infant was vigorously crying. The cord was clamped and cut and infant was handed to the awaiting neonatologist.  Cord blood was obtained.  The placenta was delivered manually intact with a 3 vessel cord.  The uterus was cleared of all clots and debris.   The Bong O retractor was placed.  Uterus, tubes and ovaries were normal in appearance.  The first layer of the hysterotomy was closed using 0 vicryl.  A second imbricating layer was placed with 0 vircyl.  The incision was inspected for hemostasis. The Bong-O retractor was removed.  Re-inspection of the hysterotomy, peritomeum and rectus muscles noted them to be entirely hemostatic.  The fascia was closed with 0 vicryl in a running fashion.  The subcutaneous tissue was copiously irrigated and any bleeding cauterized. Sub Q was closed using 2-0 plain in interrupted fashion. The skin was closed using 4-0 monocryl in subcuticular fashion.  The sponge and instrument counts were reported to me as being correct.  The patient tolerated the procedure and was stable to the recovery room.  The infant was stable to the recovery room.    Specimen:  none    Drains: rudd    Condition:   stable    Complications: None; patient tolerated the procedure well.    Comment: The physician surgical assist provided aid in exposure, hemostasis, closure and other intraoperative technical functions to help the surgeon carry out a safe operation and optimize results.     Altaf Lundy MD  8/30/2024  3:16 PM

## 2024-08-30 NOTE — ANESTHESIA PROCEDURE NOTES
Labor Analgesia    Date/Time: 8/30/2024 8:31 AM    Performed by: Jeannette Hale MD  Authorized by: Jeannette Hale MD      General Information and Staff    Start Time:  8/30/2024 8:31 AM  End Time:  8/30/2024 8:41 AM  Anesthesiologist:  Jeannette Hale MD  Performed by:  Anesthesiologist  Patient Location:  OB  Site Identification: surface landmarks    Reason for Block: labor epidural    Preanesthetic Checklist: patient identified, IV checked, risks and benefits discussed, monitors and equipment checked, pre-op evaluation, timeout performed, IV bolus, anesthesia consent and sterile technique used      Procedure Details    Patient Position:  Sitting  Prep: ChloraPrep    Monitoring:  Heart rate and continuous pulse ox  Approach:  Midline    Epidural Needle    Injection Technique:  AMELIA saline  Needle Type:  Tuohy  Needle Gauge:  17 G  Needle Length:  3.375 in  Needle Insertion Depth:  6  Location:  L3-4    Spinal Needle      Catheter    Catheter Type:  End hole  Catheter Size:  19 G  Catheter at Skin Depth:  11  Test Dose:  Negative    Assessment      Additional Comments

## 2024-08-30 NOTE — PROGRESS NOTES
Patient with significant discomfort despite bolus of epidural.    SVE - C/100/0 Station.  Patient states she is so uncomfortable she cannot push.  Also with significant nausea.  Attempted to give zofran and reposition.  Discussed laboring down and attempting pushing when she feels more relief.  Patient and spouse discussed over an hour and decided to proceed with .    Risks of bleeding, infection and damage to internal organs discussed.  All questions answered

## 2024-08-31 LAB
BASOPHILS # BLD AUTO: 0.04 X10(3) UL (ref 0–0.2)
BASOPHILS NFR BLD AUTO: 0.2 %
EOSINOPHIL # BLD AUTO: 0.07 X10(3) UL (ref 0–0.7)
EOSINOPHIL NFR BLD AUTO: 0.4 %
ERYTHROCYTE [DISTWIDTH] IN BLOOD BY AUTOMATED COUNT: 13.8 %
HCT VFR BLD AUTO: 31.4 %
HGB BLD-MCNC: 10.6 G/DL
IMM GRANULOCYTES # BLD AUTO: 0.1 X10(3) UL (ref 0–1)
IMM GRANULOCYTES NFR BLD: 0.6 %
LYMPHOCYTES # BLD AUTO: 1.12 X10(3) UL (ref 1–4)
LYMPHOCYTES NFR BLD AUTO: 6.4 %
MCH RBC QN AUTO: 31.6 PG (ref 26–34)
MCHC RBC AUTO-ENTMCNC: 33.8 G/DL (ref 31–37)
MCV RBC AUTO: 93.7 FL
MONOCYTES # BLD AUTO: 0.46 X10(3) UL (ref 0.1–1)
MONOCYTES NFR BLD AUTO: 2.6 %
NEUTROPHILS # BLD AUTO: 15.66 X10 (3) UL (ref 1.5–7.7)
NEUTROPHILS # BLD AUTO: 15.66 X10(3) UL (ref 1.5–7.7)
NEUTROPHILS NFR BLD AUTO: 89.8 %
PLATELET # BLD AUTO: 146 10(3)UL (ref 150–450)
RBC # BLD AUTO: 3.35 X10(6)UL
WBC # BLD AUTO: 17.5 X10(3) UL (ref 4–11)

## 2024-08-31 PROCEDURE — 85025 COMPLETE CBC W/AUTO DIFF WBC: CPT | Performed by: OBSTETRICS & GYNECOLOGY

## 2024-08-31 NOTE — PROGRESS NOTES
Labor Analgesia Follow Up Note    Patient underwent epidural anesthesia for labor analgesia,    Placenta Date/Time: 8/30/2024  7:56 PM     Delivery Date/Time:: 8/30/2024   3:55 PM     BP 92/49   Pulse 65   Temp 97.9 °F (36.6 °C) (Oral)   Resp 16   Wt 81.6 kg (180 lb)   LMP 12/01/2023   SpO2 98%   Breastfeeding Yes   BMI 32.40 kg/m²     Assessment:  Patient seen and no apparent anesthesia related complications.    Thank you for asking us to participate in the care of your patient.

## 2024-08-31 NOTE — PROGRESS NOTES
[facility]  Post-Partum Caesarean Section Progress Note    Haley Zaldivar Patient Status:  Inpatient    1985 MRN [id number]   Location [facility] 2SW-J Attending Марина Hampton DO   Hosp Day # 2 PCP Diana Lomeli DO     SUBJECTIVE:    Postpartum Day 1:  Delivery    The patient feels well. The patient denies emotional concerns. Pain is well controlled with current medications.Lochia is minimal. Urinary output is adequate.  The patient is tolerating a  diet. Flatus has been passed.    OBJECTIVE:    Vital signs in last 24 hours:  Temp:  [97.9 °F (36.6 °C)-98.9 °F (37.2 °C)] 97.9 °F (36.6 °C)  Pulse:  [] 63  Resp:  [12-24] 16  BP: ()/() 87/49  SpO2:  [95 %-100 %] 98 %  Input/Output:  Intake/Output                   24 0700 - 24 0659 24 0700 - 24 0659 24 0700 - 24 0659       Intake    I.V.  --  1549  --    Volume (mL) Oxytocin -- 250 --    Volume (mL) (sodium chloride 0.9 % IV bolus 300 mL) -- 300 --    Volume (mL) (lactated ringers infusion) -- 999 --    Total Intake -- 1549 --       Output    Urine  --  4200  --    Urine -- 450 --    Output (mL) ([REMOVED] Urethral Catheter Non-latex) -- 3750 --    Blood  --  535  --    Quantitative Blood Loss - (mL) -- 535 --    Total Output -- 4735 --       Net I/O     -- -3186 --            General:    alert, appears stated age, and cooperative   Bowel Sounds:  active   Abdomen Soft, NTND   Uterine Fundus:   firm below the umbilicus   Incision:  healing well, no significant drainage, no dehiscence, no significant erythema, well approximated with steri strips.     DVT Evaluation:  No evidence of DVT seen on physical exam.     Data Reviewed:  Lab Results   Component Value Date    WBC 17.5 2024    HGB 10.6 2024    HCT 31.4 2024    .0 2024                     ASSESSMENT/PLAN:    Status post  section. Doing well postoperatively.   Hb stable  Continue present  managment.    Altaf Lundy MD  8/31/2024  7:39 AM

## 2024-08-31 NOTE — PROGRESS NOTES
NURSING ADMISSION NOTE      Patient admitted via Cart  Oriented to room.  Safety precautions initiated.  Bed in low position.  Call light in reach. Report received from L/D RN.  POC reviewed w/pt & SO.

## 2024-09-01 RX ORDER — HYDROCODONE BITARTRATE AND ACETAMINOPHEN 5; 325 MG/1; MG/1
1 TABLET ORAL EVERY 6 HOURS PRN
Status: DISCONTINUED | OUTPATIENT
Start: 2024-09-01 | End: 2024-09-02

## 2024-09-01 NOTE — PROGRESS NOTES
POD#2  Pt without complaints except pain control is not adequate - difficult moving - will add narcotic  BP 91/62 (BP Location: Left arm)   Pulse 63   Temp 98.2 °F (36.8 °C) (Oral)   Resp 16   Wt 180 lb (81.6 kg)   LMP 12/01/2023   SpO2 98%   Breastfeeding Yes   BMI 32.40 kg/m²   Incision - C/D/I  Lochia - appropriate  Extrem - NT  A/P Doing well - nursing - add Norco

## 2024-09-01 NOTE — BH PROGRESS NOTE
KENNETH PPD SCREENING    Reason for Referral: PT scored an 11 on the EDPS    Current Stressors:    History of PPD: PT denied and stated that this is her first pregnancy   Financial: PT denied   Other: PT stated that she is having marital issues with her . PT also stated that her work and family have also been a big stressor for her.    Mental Health Status:    Current Symptoms: PT denied. PT stated that she is feeling much better since having the baby. PT stated that the answers to the questions were prior to the delivery.   Appearance/General Behavior: PT was well dressed and appropriately groomed   General Cognitive Functioning: PT was A&Ox4   Thought Process: PT's thought process was clear   Thought Content: PT's thought content was normal   Mood/Affect: PT's mood was content with appropriate affect   Orientation: PT is A&Ox4   Speech: PT's speech was normal   Homicidal/Suicidal Ideation/Plan: PT denied SI, HI, and AVH    Living Arrangement:    Who Resides at Home: PT lives with her  and now new baby   Has other Children: PT denied   Is Father of the Baby involved: Yes    Has Familial Support: PT denied. PT stated that her father passed away and stated that the relationship with her mother is not the best.   Has Other Support Network: PT identified her friends.    Plan:    Provide PPD Information:     Postpartum Depression Resources Given: Yes     Cradle Talk Information Given: Yes     Depression During and After Pregnancy Information given: Yes    Provide KENNETH Contact Information: Yes    Other Information Given: PT requested therapy referrals. PT stated that they currently have couples counselor at this time.

## 2024-09-01 NOTE — DISCHARGE INSTRUCTIONS
For the next six weeks:  -Nothing in the vagina (no sex, no tampons)  -Do not lift anything heavier than 15 pounds  -Limit use of stairs  -Avoid vigorous exercise  -Do not drive while taking Norco    Call the office for:  -Pain not relieved by pain medication  -Pus or discharge from wound  -Bleeding that soaks more than one pad per hour  -Fever >100.5    Clean your incision once daily with soap and water. Pat to dry. Keep the incision dry between washes.  Avoid constipation. Take colace twice daily, fiber, laxatives, water, and caffeine as needed.  Remove your steristrips in 2 weeks.        Therapists:    Debra AGUAYO  Clinical Social Work/Therapist, [facility] (she, her)  [place], [place] [address] [phone]    Destiny Salgado  Counselor, [place], [place] [address] [phone]    Amira Rivera  Clinical Social Work/Therapist, [facility], [facility] (she, her)  [address], [place] [address] [phone]    Tonya Lomeli  Psychologist, [facility]   Psychotherapy  [address] [place] [address] [phone]     Psychotherapy  [address] [place] [id number]

## 2024-09-02 VITALS
HEART RATE: 74 BPM | OXYGEN SATURATION: 98 % | RESPIRATION RATE: 14 BRPM | TEMPERATURE: 98 F | DIASTOLIC BLOOD PRESSURE: 63 MMHG | BODY MASS INDEX: 32 KG/M2 | SYSTOLIC BLOOD PRESSURE: 104 MMHG | WEIGHT: 180 LBS

## 2024-09-02 RX ORDER — HYDROCODONE BITARTRATE AND ACETAMINOPHEN 5; 325 MG/1; MG/1
1 TABLET ORAL EVERY 6 HOURS PRN
Qty: 15 TABLET | Refills: 0 | Status: SHIPPED | OUTPATIENT
Start: 2024-09-02

## 2024-09-02 RX ORDER — IBUPROFEN 600 MG/1
600 TABLET, FILM COATED ORAL EVERY 6 HOURS PRN
Qty: 30 TABLET | Refills: 0 | Status: SHIPPED | OUTPATIENT
Start: 2024-09-02

## 2024-09-02 RX ORDER — PSEUDOEPHEDRINE HCL 30 MG
100 TABLET ORAL DAILY
Qty: 14 CAPSULE | Refills: 0 | Status: SHIPPED | OUTPATIENT
Start: 2024-09-02

## 2024-09-02 NOTE — PROGRESS NOTES
Discharge teaching completed with patient. All questions answered and patient verbalized understanding of all teaching. See AVS for topics covered, written instructions also provided. Patient stable and preparing for discharge.

## 2024-09-02 NOTE — DISCHARGE SUMMARY
[facility]   part of formerly [facility]    Discharge Summary    Haley Zaldivar Patient Status:  Inpatient    1985 MRN [id number]   Location [facility] 2SW-J Attending Марина Hampton DO   Hosp Day # 4 PCP Diana Lomeli DO     Admit Date: 2024    Discharge Date : 2024    Attending Physician: Altaf Lundy MD    Reason for Admission:  Induction of labor due to oligohydramnios    Procedures:  PLTCS    Hospital Course: Patient was admitted for IOL for oliohydramnios. She received cytotec and pitocin. Progressed to complete. Was uncomfortable and requested  section. Underwent PLTCS. Postpartum course uncomplicated and found stable for discharge home.     Discharge Diagnoses: The encounter diagnosis was H/O  section.    Discharged Condition: good    Disposition: home    Patient Instructions:  Follow-up appointment with OB clinic in 6 week.    Alyssa Kumar MD  2024  9:35 AM

## 2024-09-03 PROBLEM — Z98.891 H/O CESAREAN SECTION: Status: ACTIVE | Noted: 2024-09-03

## 2024-09-04 ENCOUNTER — TELEPHONE (OUTPATIENT)
Dept: OBGYN UNIT | Facility: HOSPITAL | Age: 39
End: 2024-09-04

## 2024-09-04 NOTE — PROGRESS NOTES
09/04/24 1156   Cradle Call Follow up   Cradle call follow up date 09/04/24   Follow up needed Completed   Hypertension or Preeclampsia during pregnancy or delivery No     Outgoing Cradle Call completed:    Mom reports that she and infant are doing well.  Baby has had a pediatrician F/U visit.   Reminded pt to schedule a postpartum follow up visit.   No complaints of PPD. Feels in good spirits; discussed resources available and when to seek medical attn  Reviewed basic self and infant care.   Encouraged to follow up w/ MD's w/ further question/concerns.  Invited to Cradle Talk Group

## 2024-09-16 ENCOUNTER — NURSE ONLY (OUTPATIENT)
Dept: LACTATION | Facility: HOSPITAL | Age: 39
End: 2024-09-16
Attending: OBSTETRICS & GYNECOLOGY
Payer: COMMERCIAL

## 2024-09-16 PROCEDURE — 99213 OFFICE O/P EST LOW 20 MIN: CPT

## 2024-09-17 ENCOUNTER — TELEPHONE (OUTPATIENT)
Age: 39
End: 2024-09-17

## 2025-03-31 ENCOUNTER — PATIENT MESSAGE (OUTPATIENT)
Dept: FAMILY MEDICINE CLINIC | Facility: CLINIC | Age: 40
End: 2025-03-31

## 2025-03-31 DIAGNOSIS — Z12.31 ENCOUNTER FOR SCREENING MAMMOGRAM FOR BREAST CANCER: Primary | ICD-10-CM

## 2025-04-10 ENCOUNTER — HOSPITAL ENCOUNTER (OUTPATIENT)
Dept: MAMMOGRAPHY | Age: 40
Discharge: HOME | End: 2025-04-10
Attending: FAMILY MEDICINE
Payer: COMMERCIAL

## 2025-04-10 DIAGNOSIS — Z12.31 ENCOUNTER FOR SCREENING MAMMOGRAM FOR BREAST CANCER: ICD-10-CM

## 2025-04-10 PROCEDURE — 77063 BREAST TOMOSYNTHESIS BI: CPT | Performed by: FAMILY MEDICINE

## 2025-04-10 PROCEDURE — 77067 SCR MAMMO BI INCL CAD: CPT | Performed by: FAMILY MEDICINE

## 2025-04-14 DIAGNOSIS — Z12.39 SCREENING FOR BREAST CANCER USING NON-MAMMOGRAM MODALITY: ICD-10-CM

## 2025-04-14 DIAGNOSIS — R92.30 DENSE BREASTS: Primary | ICD-10-CM

## 2025-07-01 ENCOUNTER — HOSPITAL ENCOUNTER (OUTPATIENT)
Dept: MAMMOGRAPHY | Facility: HOSPITAL | Age: 40
Discharge: HOME | End: 2025-07-01
Attending: FAMILY MEDICINE
Payer: COMMERCIAL

## 2025-07-01 DIAGNOSIS — Z12.39 SCREENING FOR BREAST CANCER USING NON-MAMMOGRAM MODALITY: ICD-10-CM

## 2025-07-01 DIAGNOSIS — R92.30 DENSE BREASTS: ICD-10-CM

## 2025-07-01 PROCEDURE — 76641 ULTRASOUND BREAST COMPLETE: CPT | Performed by: FAMILY MEDICINE

## 2025-07-03 ENCOUNTER — OFFICE VISIT (OUTPATIENT)
Dept: FAMILY MEDICINE CLINIC | Facility: CLINIC | Age: 40
End: 2025-07-03
Payer: COMMERCIAL

## 2025-07-03 VITALS
BODY MASS INDEX: 26.74 KG/M2 | DIASTOLIC BLOOD PRESSURE: 74 MMHG | WEIGHT: 149 LBS | RESPIRATION RATE: 16 BRPM | OXYGEN SATURATION: 99 % | HEART RATE: 59 BPM | SYSTOLIC BLOOD PRESSURE: 118 MMHG | HEIGHT: 62.5 IN

## 2025-07-03 DIAGNOSIS — Z00.00 ANNUAL PHYSICAL EXAM: Primary | ICD-10-CM

## 2025-07-03 DIAGNOSIS — Z12.31 ENCOUNTER FOR SCREENING MAMMOGRAM FOR BREAST CANCER: ICD-10-CM

## 2025-07-03 NOTE — PROGRESS NOTES
HPI:   Haley Zaldivar is a 40 year old female who presents for a complete physical exam.       Wt Readings from Last 6 Encounters:   25 149 lb (67.6 kg)   24 180 lb (81.6 kg)   24 163 lb (73.9 kg)   23 128 lb (58.1 kg)   19 125 lb (56.7 kg)   10/18/19 123 lb 9.6 oz (56.1 kg)     Body mass index is 26.82 kg/m².     Cholesterol, Total (mg/dL)   Date Value   2023 170     HDL Cholesterol (mg/dL)   Date Value   2023 57     LDL Cholesterol (mg/dL)   Date Value   2023 95     AST (U/L)   Date Value   2023 11 (L)   2019 21   2016 27     ALT (U/L)   Date Value   2023 20   2019 24   2016 24       Knows Dr. Lundy     last pap- sees gyne at [facility]   H/o HPV in 2019   10 mo PP    No vaginal discharge  No bladder dysfunction  Regular menses  Not performing self breast exams  last mammogram-   No calcium and vit D supplementation  No family history of colon or ovarian cancer  PGM breast cancer - 40's     Current Outpatient Medications   Medication Sig Dispense Refill    sertraline 50 MG Oral Tab Take 1 tablet (50 mg total) by mouth daily.      prenatal vitamin with DHA 27-0.8-228 MG Oral Cap Take 1 capsule by mouth daily.      levothyroxine 50 MCG Oral Tab Take 1 tablet (50 mcg total) by mouth before breakfast. (Patient not taking: Reported on 7/3/2025)      cholecalciferol 1000 UNITS Oral Cap Take 1 capsule (1,000 Units total) by mouth daily. (Patient not taking: Reported on 7/3/2025)        Past Medical History:    Hypothyroidism    IBS (irritable bowel syndrome)      No past surgical history on file.   Family History   Problem Relation Age of Onset    Cancer Mother         kidney    Breast Cancer Paternal Grandmother 38        EST      Social History:   Social History     Socioeconomic History    Marital status:    Occupational History    Occupation: Dance instructor   Tobacco Use    Smoking status: Never     Smokeless tobacco: Never   Vaping Use    Vaping status: Never Used   Substance and Sexual Activity    Alcohol use: Never     Alcohol/week: 0.0 standard drinks of alcohol    Drug use: Never    Sexual activity: Yes     Partners: Male     Social Drivers of Health     Food Insecurity: No Food Insecurity (8/29/2024)    Food Insecurity     Food Insecurity: Never true   Transportation Needs: No Transportation Needs (8/29/2024)    Transportation Needs     Lack of Transportation: No     Car Seat: Yes   Stress: No Stress Concern Present (8/29/2024)    Stress     Feeling of Stress : No   Housing Stability: Low Risk  (8/29/2024)    Housing Stability     Housing Instability: No     Crib or Bassinette: Yes   Recent Concern: Housing Stability - Medium Risk (6/13/2024)    Housing Stability     Housing Instability: No     Crib or Bassinette: No     Occ: . : . Children: 1  Dance teacher .   Exercise: yes  Diet: healthy    REVIEW OF SYSTEMS:   GENERAL: feels well otherwise  SKIN: denies any unusual skin lesions  EYES:denies blurred vision or double vision  HEENT: denies nasal congestion, sinus pain or ST  LUNGS: denies shortness of breath with exertion  CARDIOVASCULAR: denies chest pain on exertion  GI: denies abdominal pain,denies heartburn  : denies dysuria, vaginal discharge or itching   MUSCULOSKELETAL: denies back pain  NEURO: denies headaches  PSYCHE: denies depression or anxiety  HEMATOLOGIC: denies hx of anemia  ENDOCRINE: denies thyroid history  ALL/ASTHMA: denies asthma    EXAM:   /74   Pulse 59   Resp 16   Ht 5' 2.5\" (1.588 m)   Wt 149 lb (67.6 kg)   LMP 06/18/2025   SpO2 99%   BMI 26.82 kg/m²   Body mass index is 26.82 kg/m².   GENERAL: alert and oriented X 3, well developed, well nourished,in no apparent distress  CARDIO: RRR without murmur  LUNGS: clear to auscultation  NECK: supple,no adenopathy, +  thyromegaly  HEENT: atraumatic, normocephalic,ears and throat are clear  EYES:PERRLA, EOMI,  normal,conjunctiva are clear  SKIN: norashes,no suspicious lesions  GI: good BS's,no masses, HSM or tenderness  MUSCULOSKELETAL: back is not tender,FROM of the back  EXTREMITIES: no cyanosis, clubbing or edema  NEURO: cranial nerves are intact,motor and sensory are grossly intact    ASSESSMENT AND PLAN:   Haley Zaldivar is a 40 year old female who presents with     1. Annual physical exam    - Vitamin D [E]; Future  - Free T4, (Free Thyroxine); Future  - Assay, Thyroid Stim Hormone; Future  - Lipid Panel; Future  - CBC With Differential With Platelet; Future  - Hemoglobin A1C; Future  - Vitamin B12; Future  - Comp Metabolic Panel (14); Future  - Thyroid peroxidase & thyroglobulin ab; Future    2. Encounter for screening mammogram for breast cancer    - [facility] PAULO 2D+3D SCREENING BILAT (CPT=77067/83383); Future    Discussed diet, exercise,calcium, vitamin D, fish oil and self breast exams.   Questions answered and patient indicates understanding of these issues and agrees to the plan.  Follow up in 1 year or sooner if needed

## 2025-07-24 ENCOUNTER — LAB ENCOUNTER (OUTPATIENT)
Dept: LAB | Age: 40
End: 2025-07-24
Attending: FAMILY MEDICINE
Payer: COMMERCIAL

## 2025-07-24 DIAGNOSIS — Z00.00 ANNUAL PHYSICAL EXAM: ICD-10-CM

## 2025-07-24 LAB
ALBUMIN SERPL-MCNC: 4.7 G/DL (ref 3.2–4.8)
ALBUMIN/GLOB SERPL: 1.7 {RATIO} (ref 1–2)
ALP LIVER SERPL-CCNC: 73 U/L (ref 37–98)
ALT SERPL-CCNC: 26 U/L (ref 10–49)
ANION GAP SERPL CALC-SCNC: 9 MMOL/L (ref 0–18)
AST SERPL-CCNC: 28 U/L (ref ?–34)
BASOPHILS # BLD AUTO: 0.06 X10(3) UL (ref 0–0.2)
BASOPHILS NFR BLD AUTO: 0.8 %
BILIRUB SERPL-MCNC: 0.7 MG/DL (ref 0.3–1.2)
BUN BLD-MCNC: 13 MG/DL (ref 9–23)
CALCIUM BLD-MCNC: 9.1 MG/DL (ref 8.7–10.6)
CHLORIDE SERPL-SCNC: 103 MMOL/L (ref 98–112)
CHOLEST SERPL-MCNC: 206 MG/DL (ref ?–200)
CO2 SERPL-SCNC: 25 MMOL/L (ref 21–32)
CREAT BLD-MCNC: 0.89 MG/DL (ref 0.55–1.02)
EGFRCR SERPLBLD CKD-EPI 2021: 84 ML/MIN/1.73M2 (ref 60–?)
EOSINOPHIL # BLD AUTO: 0.08 X10(3) UL (ref 0–0.7)
EOSINOPHIL NFR BLD AUTO: 1.1 %
ERYTHROCYTE [DISTWIDTH] IN BLOOD BY AUTOMATED COUNT: 12.1 %
EST. AVERAGE GLUCOSE BLD GHB EST-MCNC: 103 MG/DL (ref 68–126)
FASTING PATIENT LIPID ANSWER: NO
FASTING STATUS PATIENT QL REPORTED: NO
GLOBULIN PLAS-MCNC: 2.8 G/DL (ref 2–3.5)
GLUCOSE BLD-MCNC: 90 MG/DL (ref 70–99)
HBA1C MFR BLD: 5.2 % (ref ?–5.7)
HCT VFR BLD AUTO: 39.7 % (ref 35–48)
HDLC SERPL-MCNC: 66 MG/DL (ref 40–59)
HGB BLD-MCNC: 12.9 G/DL (ref 12–16)
IMM GRANULOCYTES # BLD AUTO: 0.02 X10(3) UL (ref 0–1)
IMM GRANULOCYTES NFR BLD: 0.3 %
LDLC SERPL CALC-MCNC: 120 MG/DL (ref ?–100)
LYMPHOCYTES # BLD AUTO: 1.39 X10(3) UL (ref 1–4)
LYMPHOCYTES NFR BLD AUTO: 19.7 %
MCH RBC QN AUTO: 30.4 PG (ref 26–34)
MCHC RBC AUTO-ENTMCNC: 32.5 G/DL (ref 31–37)
MCV RBC AUTO: 93.4 FL (ref 80–100)
MONOCYTES # BLD AUTO: 0.5 X10(3) UL (ref 0.1–1)
MONOCYTES NFR BLD AUTO: 7.1 %
NEUTROPHILS # BLD AUTO: 5.02 X10 (3) UL (ref 1.5–7.7)
NEUTROPHILS # BLD AUTO: 5.02 X10(3) UL (ref 1.5–7.7)
NEUTROPHILS NFR BLD AUTO: 71 %
NONHDLC SERPL-MCNC: 140 MG/DL (ref ?–130)
OSMOLALITY SERPL CALC.SUM OF ELEC: 284 MOSM/KG (ref 275–295)
PLATELET # BLD AUTO: 256 10(3)UL (ref 150–450)
POTASSIUM SERPL-SCNC: 4.4 MMOL/L (ref 3.5–5.1)
PROT SERPL-MCNC: 7.5 G/DL (ref 5.7–8.2)
RBC # BLD AUTO: 4.25 X10(6)UL (ref 3.8–5.3)
SODIUM SERPL-SCNC: 137 MMOL/L (ref 136–145)
T4 FREE SERPL-MCNC: 1.3 NG/DL (ref 0.8–1.7)
THYROGLOB SERPL-MCNC: 87 U/ML (ref ?–60)
THYROPEROXIDASE AB SERPL-ACNC: 210 U/ML (ref ?–60)
TRIGL SERPL-MCNC: 115 MG/DL (ref 30–149)
TSI SER-ACNC: 1.23 UIU/ML (ref 0.55–4.78)
VIT B12 SERPL-MCNC: 441 PG/ML (ref 211–911)
VIT D+METAB SERPL-MCNC: 23.2 NG/ML (ref 30–100)
VLDLC SERPL CALC-MCNC: 20 MG/DL (ref 0–30)
WBC # BLD AUTO: 7.1 X10(3) UL (ref 4–11)

## 2025-07-24 PROCEDURE — 84439 ASSAY OF FREE THYROXINE: CPT | Performed by: FAMILY MEDICINE

## 2025-07-24 PROCEDURE — 80061 LIPID PANEL: CPT | Performed by: FAMILY MEDICINE

## 2025-07-24 PROCEDURE — 82306 VITAMIN D 25 HYDROXY: CPT | Performed by: FAMILY MEDICINE

## 2025-07-24 PROCEDURE — 80050 GENERAL HEALTH PANEL: CPT | Performed by: FAMILY MEDICINE

## 2025-07-24 PROCEDURE — 86800 THYROGLOBULIN ANTIBODY: CPT | Performed by: FAMILY MEDICINE

## 2025-07-24 PROCEDURE — 86376 MICROSOMAL ANTIBODY EACH: CPT | Performed by: FAMILY MEDICINE

## 2025-07-24 PROCEDURE — 83036 HEMOGLOBIN GLYCOSYLATED A1C: CPT | Performed by: FAMILY MEDICINE

## 2025-07-24 PROCEDURE — 82607 VITAMIN B-12: CPT | Performed by: FAMILY MEDICINE
